# Patient Record
Sex: FEMALE | Race: BLACK OR AFRICAN AMERICAN | Employment: FULL TIME | URBAN - METROPOLITAN AREA
[De-identification: names, ages, dates, MRNs, and addresses within clinical notes are randomized per-mention and may not be internally consistent; named-entity substitution may affect disease eponyms.]

---

## 2018-06-19 NOTE — H&P
PATIENT: Jhonny Crow  YOB: 1960  DATE: 06/11/2018 11:15 AM   VISIT TYPE: Office Visit  _____________________________________________________________    Completed Orders (this encounter)  Order Deborah Villegas Interpretation Result Next Lab Date   surgery instructions given education       surgery instructions given education         Assessment/Plan  # Detail Type Description    1. Assessment Cancer of central portion of female breast (C50.119). Impression discussed placement of port, risks and benefits. Patient Plan port a cath placement                  This 59year old female presents for Breast cancer. History of Present Illness:  1. Breast cancer      The initial visit date was 05/29/2018. The date of diagnosis was May 2018. The patient reports a performance level of 100% (Karnofsky Performance Scale). The patient reports the pain level is 0/10. The problem is stable. Initial symptoms include lymphadenopathy. Self breast exam was performed. Mammogram was performed. Clinical breast exam was performed. Identified risk factors include family history of breast cancer. Performance status is scored as a 0. The patient is fully active and able to perform all normal activities. Pertinent negatives include bleeding, chest pain, cough, dyspnea, dysuria, fever, headache, night sweats and weight loss. Additional information includes MRI shows enlarged R axillary LN's, there are 2 areas in R LIQ that will require bx, she has seen med onc and will start neoadjuvant CT. PAST MEDICAL/SURGICAL HISTORY  (Detailed)      Family History:  (Detailed)    Social History:  (Detailed)  Tobacco use reviewed. Preferred language is [place]. MARITAL STATUS/FAMILY/SOCIAL SUPPORT  Currently . Smoking status: Never smoker.     SMOKING STATUS  Use Status Type Smoking Status Usage Per Day Years Used Total Pack Years   no/never  Never smoker          Sabianism/SPIRITUAL  The patient has None Jew affiliation. Allergies:  Ingredient Reaction Medication Name Comment   NO KNOWN ALLERGIES        Review of Systems  System Neg/Pos Details   Constitutional Negative Fever, night sweats and weight loss. ENMT Negative Hearing loss, tinnitus, vertigo and voice change. Eyes Negative Diplopia and vision loss. Respiratory Negative Asthma, cough, dyspnea, hemoptysis, known TB exposure and wheezing. Cardio Negative Chest pain, claudication, edema, irregular heartbeat/palpitations and thrombophlebitis. GI Negative Bloating, dysphagia, hemorrhoids, jaundice and reflux.  Negative Dysuria, nocturia, passage stone/gravel and urinary incontinence. Endocrine Negative Cold intolerance and goiter. Neuro Negative Headache and syncope. Integumentary Negative Change in shape/size of mole(s) and skin lesion. MS Negative Back pain and bone/joint symptoms. Hema/Lymph Negative Easy bleeding and easy bruising. Allergic/Immuno Negative Contact allergy and contact dermatitis. Vital Signs     Height  Time ft in cm Last Measured Height Position   10:50 AM 5.0 3.00 160.02 06/11/2018 0     Blood Pressure  Time BP mm/Hg Position Side Site Method Cuff Size   10:50 /82 sitting left arm automatic adult large     Temperature/Pulse/Respiration  Time Temp F Temp C Temp Site Pulse/min Pattern Resp/ min   10:50 AM 98.30 36.83  78 regular      Pain Scale  Time Pain Score Method   10:50 AM 0/10 Numeric Pain Intensity Scale     Comments  Time Comments   10:50 AM  Unable to obtain Weight. Measured By  Time Measured by   10:50 AM [place] [employer]       Physical Exam:  Exam Findings Details   Constitutional Normal No acute distress. Well nourished. Well developed. Eyes Normal General - Right: Normal, Left: Normal. Sclera - Right: Normal, Left: Normal.   Respiratory Normal Cough - Absent. Effort - Normal.   Cardiovascular Normal Inspection - JVD: Absent. Heart rate - Regular rate. Rhythm - Regular. Extremity Normal No Cyanosis. No Edema. Neurological Normal Level of consciousness - Normal. Orientation - Normal. Memory - Normal.   Psychiatric Normal Orientation - Oriented to time, place, person & situation. No agitation. Not anxious. Appropriate mood and affect.        Completed by:        Solange Noland 06/11/2018 2:28 PM   Document generated by:  Luz Elena Donato 06/11/2018 02:28 PM     -----------------------------------------------------------------------------------------------------------                          Electronically signed by Luz Elena Donato MD on 06/13/2018 11:13 AM

## 2018-06-21 ENCOUNTER — ANESTHESIA (OUTPATIENT)
Dept: SURGERY | Age: 58
End: 2018-06-21
Payer: COMMERCIAL

## 2018-06-21 ENCOUNTER — HOSPITAL ENCOUNTER (OUTPATIENT)
Age: 58
Setting detail: OUTPATIENT SURGERY
Discharge: HOME | End: 2018-06-21
Attending: SURGERY | Admitting: SURGERY
Payer: COMMERCIAL

## 2018-06-21 ENCOUNTER — APPOINTMENT (OUTPATIENT)
Dept: GENERAL RADIOLOGY | Age: 58
End: 2018-06-21
Attending: SURGERY
Payer: COMMERCIAL

## 2018-06-21 ENCOUNTER — ANESTHESIA EVENT (OUTPATIENT)
Dept: SURGERY | Age: 58
End: 2018-06-21
Payer: COMMERCIAL

## 2018-06-21 VITALS
TEMPERATURE: 98.6 F | WEIGHT: 179.38 LBS | HEIGHT: 63 IN | SYSTOLIC BLOOD PRESSURE: 107 MMHG | DIASTOLIC BLOOD PRESSURE: 64 MMHG | OXYGEN SATURATION: 98 % | RESPIRATION RATE: 16 BRPM | BODY MASS INDEX: 31.79 KG/M2 | HEART RATE: 76 BPM

## 2018-06-21 DIAGNOSIS — C50.311 MALIGNANT NEOPLASM OF LOWER-INNER QUADRANT OF RIGHT FEMALE BREAST, UNSPECIFIED ESTROGEN RECEPTOR STATUS (HCC): Primary | ICD-10-CM

## 2018-06-21 PROCEDURE — 74011250636 HC RX REV CODE- 250/636: Performed by: SURGERY

## 2018-06-21 PROCEDURE — 74011250636 HC RX REV CODE- 250/636

## 2018-06-21 PROCEDURE — 74011000250 HC RX REV CODE- 250: Performed by: SURGERY

## 2018-06-21 PROCEDURE — 74011000250 HC RX REV CODE- 250

## 2018-06-21 PROCEDURE — 76010000138 HC OR TIME 0.5 TO 1 HR: Performed by: SURGERY

## 2018-06-21 PROCEDURE — 77030038020 HC MANFLD NEPTUNE STRY -B: Performed by: SURGERY

## 2018-06-21 PROCEDURE — 77030011267 HC ELECTRD BLD COVD -A: Performed by: SURGERY

## 2018-06-21 PROCEDURE — 77030002933 HC SUT MCRYL J&J -A: Performed by: SURGERY

## 2018-06-21 PROCEDURE — 77030002986 HC SUT PROL J&J -A: Performed by: SURGERY

## 2018-06-21 PROCEDURE — 76210000026 HC REC RM PH II 1 TO 1.5 HR: Performed by: SURGERY

## 2018-06-21 PROCEDURE — 76060000032 HC ANESTHESIA 0.5 TO 1 HR: Performed by: SURGERY

## 2018-06-21 PROCEDURE — 77030020782 HC GWN BAIR PAWS FLX 3M -B: Performed by: SURGERY

## 2018-06-21 PROCEDURE — C1788 PORT, INDWELLING, IMP: HCPCS | Performed by: SURGERY

## 2018-06-21 PROCEDURE — 77030032490 HC SLV COMPR SCD KNE COVD -B: Performed by: SURGERY

## 2018-06-21 PROCEDURE — 77030020269 HC MISC IMPL: Performed by: SURGERY

## 2018-06-21 RX ORDER — BUPIVACAINE HYDROCHLORIDE 5 MG/ML
INJECTION, SOLUTION EPIDURAL; INTRACAUDAL AS NEEDED
Status: DISCONTINUED | OUTPATIENT
Start: 2018-06-21 | End: 2018-06-21 | Stop reason: HOSPADM

## 2018-06-21 RX ORDER — ONDANSETRON 2 MG/ML
INJECTION INTRAMUSCULAR; INTRAVENOUS AS NEEDED
Status: DISCONTINUED | OUTPATIENT
Start: 2018-06-21 | End: 2018-06-21 | Stop reason: HOSPADM

## 2018-06-21 RX ORDER — ONDANSETRON 2 MG/ML
4 INJECTION INTRAMUSCULAR; INTRAVENOUS ONCE
Status: CANCELLED | OUTPATIENT
Start: 2018-06-21 | End: 2018-06-21

## 2018-06-21 RX ORDER — LIDOCAINE HYDROCHLORIDE 20 MG/ML
INJECTION, SOLUTION EPIDURAL; INFILTRATION; INTRACAUDAL; PERINEURAL AS NEEDED
Status: DISCONTINUED | OUTPATIENT
Start: 2018-06-21 | End: 2018-06-21 | Stop reason: HOSPADM

## 2018-06-21 RX ORDER — FENTANYL CITRATE 50 UG/ML
25 INJECTION, SOLUTION INTRAMUSCULAR; INTRAVENOUS AS NEEDED
Status: CANCELLED | OUTPATIENT
Start: 2018-06-21

## 2018-06-21 RX ORDER — SODIUM CHLORIDE, SODIUM LACTATE, POTASSIUM CHLORIDE, CALCIUM CHLORIDE 600; 310; 30; 20 MG/100ML; MG/100ML; MG/100ML; MG/100ML
125 INJECTION, SOLUTION INTRAVENOUS CONTINUOUS
Status: DISCONTINUED | OUTPATIENT
Start: 2018-06-21 | End: 2018-06-21 | Stop reason: HOSPADM

## 2018-06-21 RX ORDER — HYDROCODONE BITARTRATE AND ACETAMINOPHEN 5; 300 MG/1; MG/1
1 TABLET ORAL
Qty: 20 TAB | Refills: 0 | Status: SHIPPED | OUTPATIENT
Start: 2018-06-21 | End: 2018-12-24

## 2018-06-21 RX ORDER — PROPOFOL 10 MG/ML
INJECTION, EMULSION INTRAVENOUS
Status: DISCONTINUED | OUTPATIENT
Start: 2018-06-21 | End: 2018-06-21 | Stop reason: HOSPADM

## 2018-06-21 RX ORDER — FENTANYL CITRATE 50 UG/ML
50 INJECTION, SOLUTION INTRAMUSCULAR; INTRAVENOUS
Status: CANCELLED | OUTPATIENT
Start: 2018-06-21

## 2018-06-21 RX ORDER — MAGNESIUM SULFATE 100 %
4 CRYSTALS MISCELLANEOUS AS NEEDED
Status: CANCELLED | OUTPATIENT
Start: 2018-06-21

## 2018-06-21 RX ORDER — SODIUM CHLORIDE 9 MG/ML
125 INJECTION, SOLUTION INTRAVENOUS CONTINUOUS
Status: CANCELLED | OUTPATIENT
Start: 2018-06-21

## 2018-06-21 RX ORDER — FENTANYL CITRATE 50 UG/ML
INJECTION, SOLUTION INTRAMUSCULAR; INTRAVENOUS AS NEEDED
Status: DISCONTINUED | OUTPATIENT
Start: 2018-06-21 | End: 2018-06-21 | Stop reason: HOSPADM

## 2018-06-21 RX ORDER — DEXAMETHASONE SODIUM PHOSPHATE 4 MG/ML
INJECTION, SOLUTION INTRA-ARTICULAR; INTRALESIONAL; INTRAMUSCULAR; INTRAVENOUS; SOFT TISSUE AS NEEDED
Status: DISCONTINUED | OUTPATIENT
Start: 2018-06-21 | End: 2018-06-21 | Stop reason: HOSPADM

## 2018-06-21 RX ORDER — MIDAZOLAM HYDROCHLORIDE 1 MG/ML
INJECTION, SOLUTION INTRAMUSCULAR; INTRAVENOUS AS NEEDED
Status: DISCONTINUED | OUTPATIENT
Start: 2018-06-21 | End: 2018-06-21 | Stop reason: HOSPADM

## 2018-06-21 RX ORDER — DEXTROSE 50 % IN WATER (D50W) INTRAVENOUS SYRINGE
25-50 AS NEEDED
Status: CANCELLED | OUTPATIENT
Start: 2018-06-21

## 2018-06-21 RX ORDER — NALOXONE HYDROCHLORIDE 0.4 MG/ML
0.1 INJECTION, SOLUTION INTRAMUSCULAR; INTRAVENOUS; SUBCUTANEOUS AS NEEDED
Status: CANCELLED | OUTPATIENT
Start: 2018-06-21

## 2018-06-21 RX ORDER — SODIUM CHLORIDE 0.9 % (FLUSH) 0.9 %
5-10 SYRINGE (ML) INJECTION AS NEEDED
Status: CANCELLED | OUTPATIENT
Start: 2018-06-21

## 2018-06-21 RX ADMIN — DEXAMETHASONE SODIUM PHOSPHATE 4 MG: 4 INJECTION, SOLUTION INTRA-ARTICULAR; INTRALESIONAL; INTRAMUSCULAR; INTRAVENOUS; SOFT TISSUE at 13:55

## 2018-06-21 RX ADMIN — PROPOFOL 75 MCG/KG/MIN: 10 INJECTION, EMULSION INTRAVENOUS at 13:40

## 2018-06-21 RX ADMIN — SODIUM CHLORIDE, SODIUM LACTATE, POTASSIUM CHLORIDE, AND CALCIUM CHLORIDE: 600; 310; 30; 20 INJECTION, SOLUTION INTRAVENOUS at 13:55

## 2018-06-21 RX ADMIN — LIDOCAINE HYDROCHLORIDE 100 MG: 20 INJECTION, SOLUTION EPIDURAL; INFILTRATION; INTRACAUDAL; PERINEURAL at 13:40

## 2018-06-21 RX ADMIN — MIDAZOLAM HYDROCHLORIDE 2 MG: 1 INJECTION, SOLUTION INTRAMUSCULAR; INTRAVENOUS at 13:32

## 2018-06-21 RX ADMIN — SODIUM CHLORIDE, SODIUM LACTATE, POTASSIUM CHLORIDE, AND CALCIUM CHLORIDE 125 ML/HR: 600; 310; 30; 20 INJECTION, SOLUTION INTRAVENOUS at 12:21

## 2018-06-21 RX ADMIN — ONDANSETRON 4 MG: 2 INJECTION INTRAMUSCULAR; INTRAVENOUS at 13:32

## 2018-06-21 RX ADMIN — FENTANYL CITRATE 100 MCG: 50 INJECTION, SOLUTION INTRAMUSCULAR; INTRAVENOUS at 13:32

## 2018-06-21 NOTE — ANESTHESIA POSTPROCEDURE EVALUATION
Post-Anesthesia Evaluation and Assessment    Cardiovascular Function/Vital Signs  Visit Vitals    /64    Pulse 76    Temp 37 °C (98.6 °F)    Resp 16    Ht 5' 3\" (1.6 m)    Wt 81.4 kg (179 lb 6 oz)    SpO2 98%    BMI 31.77 kg/m2       Patient is status post Procedure(s):  PORT A CATHETER PLACEMENT W/C-ARM. Nausea/Vomiting: Controlled. Postoperative hydration reviewed and adequate. Pain:  Pain Scale 1: Numeric (0 - 10) (06/21/18 1515)  Pain Intensity 1: 0 (06/21/18 1515)   Managed. Neurological Status:   Neuro (WDL): Within Defined Limits (06/21/18 1209)   At baseline. Mental Status and Level of Consciousness: Arousable. Pulmonary Status:   O2 Device: Room air (06/21/18 1445)   Adequate oxygenation and airway patent. Complications related to anesthesia: None    Post-anesthesia assessment completed. No concerns. Patient has met all discharge requirements.     Signed By: Felicita Rangel MD    June 21, 2018

## 2018-06-21 NOTE — INTERVAL H&P NOTE
H&P Update:  Charity Mac was seen and examined. History and physical has been reviewed. The patient has been examined. There have been no significant clinical changes since the completion of the originally dated History and Physical.  Patient identified by surgeon; surgical site was confirmed by patient and surgeon.     Signed By: Monisha Washington MD     June 21, 2018 12:32 PM

## 2018-06-21 NOTE — DISCHARGE INSTRUCTIONS
May shower in 2 days. Return to see Dr. Bryce Appiah in 1 week. Implanted Port: What to Expect at [address] have had a procedure to implant a port. A port is a device placed, in most cases, under the skin of your chest below your collarbone. It is made of plastic, stainless steel, or titanium. It's about the size of a quarter, but thicker. It looks like a small bump under your skin. A thin, flexible tube called a catheter runs under the skin from the port into a large vein. With the port, you will be able to get medicines (such as chemotherapy) with more comfort. You also can get blood, nutrients, or other fluids. Blood can be taken through the port for tests. You will probably have some discomfort and bruising at the port site. This will go away in a few days. The port can be used right away. You may have the port for weeks, months, or longer. Your port will need to be flushed out regularly to keep it open. A nurse or other health professional will do this for you. This care sheet gives you a general idea about how long it will take for you to recover. But each person recovers at a different pace. Follow the steps below to feel better as quickly as possible. How can you care for yourself at home? Activity  ? · Avoid arm and upper body movements that may pull on the catheter. These movements include heavy weight lifting and vigorous use of your arms. ? · You will probably need to take 1 day off from work and will be able to return to normal activities shortly after. This depends on the type of work you do, why you have the catheter, and how you feel. ? · You probably will be able to take baths and swim. But you may need to avoid some activities. Talk to your doctor about any limits on your activity. ? · Ask your doctor when you can drive again. Be careful when you pull your seat belt across your chest so it doesn't pull out the catheter. It's okay if the seat belt lays over the catheter. Medicines  ? · Your doctor will tell you if and when you can restart your medicines. He or she will also give you instructions about taking any new medicines. ? · If you take blood thinners, such as warfarin (Coumadin), clopidogrel (Plavix), or aspirin, be sure to talk to your doctor. He or she will tell you if and when to start taking those medicines again. Make sure that you understand exactly what your doctor wants you to do. ? · Be safe with medicines. Read and follow all instructions on the label. ¨ If the doctor gave you a prescription medicine for pain, take it as prescribed. ¨ If you are not taking a prescription pain medicine, ask your doctor if you can take an over-the-counter medicine. Incision care  ? · If you have a bandage, your doctor will tell you when you can remove it. After you remove the bandage, you may shower. Wash the area with soap and water and pat it dry. Don't use hydrogen peroxide or alcohol, which can slow healing. You may cover the area with a gauze bandage if it weeps or rubs against clothing. Change the bandage every day. ? · If you have strips of tape on the cut (incision) the doctor made, leave the tape on for a week or until it falls off. Other instructions  ? · Always carry the medical alert card that your doctor gives you. It contains information about your port. It will tell health care workers that you have a port in case you need emergency care. ? · When you get dressed, be careful not to rub the port. Do not wear a bra or suspenders that irritate your skin near the port. ? · Do not have your blood pressure taken on the arm with the port. Follow-up care is a key part of your treatment and safety. Be sure to make and go to all appointments, and call your doctor if you are having problems. It's also a good idea to know your test results and keep a list of the medicines you take. When should you call for help?   Call your doctor now or seek immediate medical care if:  ? · You have signs of infection, such as:  ¨ Increased pain, swelling, warmth, or redness. ¨ Red streaks leading from the area. ¨ Pus draining from the area. ¨ A fever. ? · You have pain or swelling in your neck or arm. ? · You have trouble breathing. ? Watch closely for changes in your health, and be sure to contact your doctor if:  ? · You have any problems with your line or port. Where can you learn more? Go to http://suzi-chapo.info/. Enter M256 in the search box to learn more about \"Implanted Port: What to Expect at Home. \"  Current as of: March 20, 2017  Content Version: 11.4  © 4174-6377 [employer]. Care instructions adapted under license by [employer] (which disclaims liability or warranty for this information). If you have questions about a medical condition or this instruction, always ask your healthcare professional. [place] [address] any warranty or liability for your use of this information. DISCHARGE SUMMARY from Nurse    PATIENT INSTRUCTIONS:    After general anesthesia or intravenous sedation, for 24 hours or while taking prescription Narcotics:  · Limit your activities  · Do not drive and operate hazardous machinery  · Do not make important personal or business decisions  · Do  not drink alcoholic beverages  · If you have not urinated within 8 hours after discharge, please contact your surgeon on call.     Report the following to your surgeon:  · Excessive pain, swelling, redness or odor of or around the surgical area  · Temperature over 100.5  · Nausea and vomiting lasting longer than 4 hours or if unable to take medications  · Any signs of decreased circulation or nerve impairment to extremity: change in color, persistent  numbness, tingling, coldness or increase pain  · Any questions    What to do at Home:  Recommended activity: Activity as tolerated and no driving for today,     If you experience any of the following symptoms as per above, please follow up with Dr Muñiz at [phone]. *  Please give a list of your current medications to your Primary Care Provider. *  Please update this list whenever your medications are discontinued, doses are      changed, or new medications (including over-the-counter products) are added. *  Please carry medication information at all times in case of emergency situations. These are general instructions for a healthy lifestyle:    No smoking/ No tobacco products/ Avoid exposure to second hand smoke  Surgeon General's Warning:  Quitting smoking now greatly reduces serious risk to your health. Obesity, smoking, and sedentary lifestyle greatly increases your risk for illness    A healthy diet, regular physical exercise & weight monitoring are important for maintaining a healthy lifestyle    You may be retaining fluid if you have a history of heart failure or if you experience any of the following symptoms:  Weight gain of 3 pounds or more overnight or 5 pounds in a week, increased swelling in our hands or feet or shortness of breath while lying flat in bed. Please call your doctor as soon as you notice any of these symptoms; do not wait until your next office visit. Recognize signs and symptoms of STROKE:    F-face looks uneven    A-arms unable to move or move unevenly    S-speech slurred or non-existent    T-time-call 911 as soon as signs and symptoms begin-DO NOT go       Back to bed or wait to see if you get better-TIME IS BRAIN. Warning Signs of HEART ATTACK     Call 911 if you have these symptoms:   Chest discomfort. Most heart attacks involve discomfort in the center of the chest that lasts more than a few minutes, or that goes away and comes back. It can feel like uncomfortable pressure, squeezing, fullness, or pain.  Discomfort in other areas of the upper body.  Symptoms can include pain or discomfort in one or both arms, the back, neck, jaw, or stomach.  Shortness of breath with or without chest discomfort.  Other signs may include breaking out in a cold sweat, nausea, or lightheadedness. Don't wait more than five minutes to call 911 - MINUTES MATTER! Fast action can save your life. Calling 911 is almost always the fastest way to get lifesaving treatment. Emergency Medical Services staff can begin treatment when they arrive -- up to an hour sooner than if someone gets to the hospital by car. Patient armband removed and shredded  The discharge information has been reviewed with the patient and spouse. The patient and spouse verbalized understanding. Discharge medications reviewed with the patient and spouse and appropriate educational materials and side effects teaching were provided.   ___________________________________________________________________________________________________________________________________

## 2018-06-21 NOTE — ANESTHESIA PREPROCEDURE EVALUATION
Anesthetic History   No history of anesthetic complications     Pertinent negatives: No PONV       Review of Systems / Medical History  Patient summary reviewed, nursing notes reviewed and pertinent labs reviewed    Pulmonary              Pertinent negatives: No COPD, asthma and smoker     Neuro/Psych   Within defined limits           Cardiovascular  Within defined limits              Pertinent negatives: No hypertension, past MI and CAD       GI/Hepatic/Renal  Within defined limits           Pertinent negatives: No GERD, liver disease and renal disease   Endo/Other        Cancer  Pertinent negatives: No diabetes   Other Findings   Comments: etoh neg           Physical Exam    Airway  Mallampati: III  TM Distance: 4 - 6 cm  Neck ROM: normal range of motion   Mouth opening: Normal     Cardiovascular               Dental    Dentition: Upper partial plate     Pulmonary                 Abdominal         Other Findings            Anesthetic Plan    ASA: 2  Anesthesia type: MAC          Induction: Intravenous  Anesthetic plan and risks discussed with: Patient

## 2018-06-21 NOTE — IP AVS SNAPSHOT
[address] [address] 
[phone] Patient: Darrin Nguyễn MRN: [id number] XDX:[id number]/81/[id number] About your hospitalization You were admitted on:  June 21, 2018 You last received care in the:  [facility] PHASE 2 RECOVERY You were discharged on:  June 21, 2018 Why you were hospitalized Your primary diagnosis was:  Not on File Follow-up Information Follow up With Details Comments Contact Info Genaro Torres MD   [id number] [place] [address] 
[phone] Anne Adrian MD Go in 1 week(s)  860 [facility] [address] [facility] 
[phone] Discharge Orders None A check imelda indicates which time of day the medication should be taken. My Medications START taking these medications Instructions Each Dose to Equal  
 Morning Noon Evening Bedtime HYDROcodone-acetaminophen 5-300 mg tablet Commonly known as:  Guanaco Hernadez Your last dose was: Your next dose is: Take 1 Tab by mouth every six (6) hours as needed. Max Daily Amount: 4 Tabs. 1 Tab CONTINUE taking these medications Instructions Each Dose to Equal  
 Morning Noon Evening Bedtime [address] 200 mcg Chew Generic drug:  Pediatric Multivit Comb#19-FA Your last dose was: Your next dose is: Take  by mouth daily. Where to Get Your Medications Information on where to get these meds will be given to you by the nurse or doctor. ! Ask your nurse or doctor about these medications HYDROcodone-acetaminophen 5-300 mg tablet Opioid Education  Prescription Opioids: What You Need to Know: 
 
Prescription opioids can be used to help relieve moderate-to-severe pain and are often prescribed following a surgery or injury, or for certain health conditions. These medications can be an important part of treatment but also come with serious risks. Opioids are strong pain medicines. Examples include hydrocodone, oxycodone, fentanyl, and morphine. Heroin is an example of an illegal opioid. It is important to work with your health care provider to make sure you are getting the safest, most effective care. WHAT ARE THE RISKS AND SIDE EFFECTS OF OPIOID USE? Prescription opioids carry serious risks of addiction and overdose, especially with prolonged use. An opioid overdose, often marked by slow breathing, can cause sudden death. The use of prescription opioids can have a number of side effects as well, even when taken as directed. · Tolerance-meaning you might need to take more of a medication for the same pain relief · Physical dependence-meaning you have symptoms of withdrawal when the medication is stopped. Withdrawal symptoms can include nausea, sweating, chills, diarrhea, stomach cramps, and muscle aches. Withdrawal can last up to several weeks, depending on which drug you took and how long you took it. · Increased sensitivity to pain · Constipation · Nausea, vomiting, and dry mouth · Sleepiness and dizziness · Confusion · Depression · Low levels of testosterone that can result in lower sex drive, energy, and strength · Itching and sweating RISKS ARE GREATER WITH:      
· History of drug misuse, substance use disorder, or overdose · Mental health conditions (such as depression or anxiety) · Sleep apnea · Older age (72 years or older) · Pregnancy Avoid alcohol while taking prescription opioids. Also, unless specifically advised by your health care provider, medications to avoid include: · Benzodiazepines (such as Xanax or Valium) · Muscle relaxants (such as Soma or Flexeril) · Hypnotics (such as Ambien or Lunesta) · Other prescription opioids KNOW YOUR OPTIONS Talk to your health care provider about ways to manage your pain that don't involve prescription opioids. Some of these options may actually work better and have fewer risks and side effects. Options may include: 
· Pain relievers such as acetaminophen, ibuprofen, and naproxen · Some medications that are also used for depression or seizures · Physical therapy and exercise · Counseling to help patients learn how to cope better with triggers of pain and stress. · Application of heat or cold compress · Massage therapy · Relaxation techniques Be Informed Make sure you know the name of your medication, how much and how often to take it, and its potential risks & side effects. IF YOU ARE PRESCRIBED OPIOIDS FOR PAIN: 
· Never take opioids in greater amounts or more often than prescribed. Remember the goal is not to be pain-free but to manage your pain at a tolerable level. · Follow up with your primary care provider to: · Work together to create a plan on how to manage your pain. · Talk about ways to help manage your pain that don't involve prescription opioids. · Talk about any and all concerns and side effects. · Help prevent misuse and abuse. · Never sell or share prescription opioids · Help prevent misuse and abuse. · Store prescription opioids in a secure place and out of reach of others (this may include visitors, children, friends, and family). · Safely dispose of unused/unwanted prescription opioids: Find your community drug take-back program or your pharmacy mail-back program, or flush them down the toilet, following guidance from the Food and Drug Administration (www.fda.gov/Drugs/ResourcesForYou). · Visit www.cdc.gov/drugoverdose to learn about the risks of opioid abuse and overdose. · If you believe you may be struggling with addiction, tell your health care provider and ask for guidance or call [facility] at 0-704-345-TXIT. Discharge Instructions May shower in 2 days. Return to see Dr. Yvan Etienne in 1 week. Implanted Port: What to Expect at [facility] Your Recovery You have had a procedure to implant a port. A port is a device placed, in most cases, under the skin of your chest below your collarbone. It is made of plastic, stainless steel, or titanium. It's about the size of a quarter, but thicker. It looks like a small bump under your skin. A thin, flexible tube called a catheter runs under the skin from the port into a large vein. With the port, you will be able to get medicines (such as chemotherapy) with more comfort. You also can get blood, nutrients, or other fluids. Blood can be taken through the port for tests. You will probably have some discomfort and bruising at the port site. This will go away in a few days. The port can be used right away. You may have the port for weeks, months, or longer. Your port will need to be flushed out regularly to keep it open. A nurse or other health professional will do this for you. This care sheet gives you a general idea about how long it will take for you to recover. But each person recovers at a different pace. Follow the steps below to feel better as quickly as possible. How can you care for yourself at home? Activity ? · Avoid arm and upper body movements that may pull on the catheter. These movements include heavy weight lifting and vigorous use of your arms. ? · You will probably need to take 1 day off from work and will be able to return to normal activities shortly after. This depends on the type of work you do, why you have the catheter, and how you feel. ? · You probably will be able to take baths and swim. But you may need to avoid some activities. Talk to your doctor about any limits on your activity. ? · Ask your doctor when you can drive again.  Be careful when you pull your seat belt across your chest so it doesn't pull out the catheter. It's okay if the seat belt lays over the catheter. Medicines ? · Your doctor will tell you if and when you can restart your medicines. He or she will also give you instructions about taking any new medicines. ? · If you take blood thinners, such as warfarin (Coumadin), clopidogrel (Plavix), or aspirin, be sure to talk to your doctor. He or she will tell you if and when to start taking those medicines again. Make sure that you understand exactly what your doctor wants you to do. ? · Be safe with medicines. Read and follow all instructions on the label. ¨ If the doctor gave you a prescription medicine for pain, take it as prescribed. ¨ If you are not taking a prescription pain medicine, ask your doctor if you can take an over-the-counter medicine. Incision care ? · If you have a bandage, your doctor will tell you when you can remove it. After you remove the bandage, you may shower. Wash the area with soap and water and pat it dry. Don't use hydrogen peroxide or alcohol, which can slow healing. You may cover the area with a gauze bandage if it weeps or rubs against clothing. Change the bandage every day. ? · If you have strips of tape on the cut (incision) the doctor made, leave the tape on for a week or until it falls off. Other instructions ? · Always carry the medical alert card that your doctor gives you. It contains information about your port. It will tell health care workers that you have a port in case you need emergency care. ? · When you get dressed, be careful not to rub the port. Do not wear a bra or suspenders that irritate your skin near the port. ? · Do not have your blood pressure taken on the arm with the port. Follow-up care is a key part of your treatment and safety.  Be sure to make and go to all appointments, and call your doctor if you are having problems. It's also a good idea to know your test results and keep a list of the medicines you take. When should you call for help? Call your doctor now or seek immediate medical care if: 
? · You have signs of infection, such as: 
¨ Increased pain, swelling, warmth, or redness. ¨ Red streaks leading from the area. ¨ Pus draining from the area. ¨ A fever. ? · You have pain or swelling in your neck or arm. ? · You have trouble breathing. ? Watch closely for changes in your health, and be sure to contact your doctor if: 
? · You have any problems with your line or port. Where can you learn more? Go to http://suzi-chapo.info/. Enter M256 in the search box to learn more about \"Implanted Port: What to Expect at Home. \" Current as of: March 20, 2017 Content Version: 11.4 © 1802-0904 [employer]. Care instructions adapted under license by [employer] (which disclaims liability or warranty for this information). If you have questions about a medical condition or this instruction, always ask your healthcare professional. [place] [address] any warranty or liability for your use of this information. DISCHARGE SUMMARY from Nurse PATIENT INSTRUCTIONS: 
 
 
F-face looks uneven A-arms unable to move or move unevenly S-speech slurred or non-existent T-time-call 911 as soon as signs and symptoms begin-DO NOT go Back to bed or wait to see if you get better-TIME IS BRAIN. Warning Signs of HEART ATTACK Call 911 if you have these symptoms: 
? Chest discomfort.  Most heart attacks involve discomfort in the center of the chest that lasts more than a few minutes, or that goes away and comes back. It can feel like uncomfortable pressure, squeezing, fullness, or pain. ? Discomfort in other areas of the upper body. Symptoms can include pain or discomfort in one or both arms, the back, neck, jaw, or stomach. ? Shortness of breath with or without chest discomfort. ? Other signs may include breaking out in a cold sweat, nausea, or lightheadedness. Don't wait more than five minutes to call [address]! Fast action can save your life. Calling 911 is almost always the fastest way to get lifesaving treatment. Emergency Medical Services staff can begin treatment when they arrive  up to an hour sooner than if someone gets to the hospital by car. Patient armband removed and shredded The discharge information has been reviewed with the patient and spouse. The patient and spouse verbalized understanding. Discharge medications reviewed with the patient and spouse and appropriate educational materials and side effects teaching were provided. ___________________________________________________________________________________________________________________________________ Introducing [facility]! [employer] introduces [employer] patient portal. Now you can access parts of your medical record, email your doctor's office, and request medication refills online. 1. In your internet browser, go to [email or web address] 2. Click on the First Time User? Click Here link in the Sign In box. You will see the New Member Sign Up page. 3. Enter your [employer] Access Code exactly as it appears below. You will not need to use this code after youve completed the sign-up process. If you do not sign up before the expiration date, you must request a new code. · [employer] Access Code: [email or web address] Expires: 9/9/2018  5:18 PM 
 
4. Enter the last four digits of your Social Security Number (xxxx) and Date of Birth (mm/dd/yyyy) as indicated and click Submit.  You will be taken to the next sign-up page. 5. Create a Media Armort ID. This will be your [employer] login ID and cannot be changed, so think of one that is secure and easy to remember. 6. Create a [employer] password. You can change your password at any time. 7. Enter your Password Reset Question and Answer. This can be used at a later time if you forget your password. 8. Enter your e-mail address. You will receive e-mail notification when new information is available in [address] 9. Click Sign Up. You can now view and download portions of your medical record. 10. Click the Download Summary menu link to download a portable copy of your medical information. If you have questions, please visit the Frequently Asked Questions section of the [employer] website. Remember, [employer] is NOT to be used for urgent needs. For medical emergencies, dial 911. Now available from your iPhone and Android! Introducing Sam Rea As a [facility] patient, I wanted to make you aware of our electronic visit tool called Sam Rea. [facility] 24/7 allows you to connect within minutes with a medical provider 24 hours a day, seven days a week via a mobile device or tablet or logging into a secure website from your computer. You can access Sam Rea from anywhere in the [place]. A virtual visit might be right for you when you have a simple condition and feel like you just dont want to get out of bed, or cant get away from work for an appointment, when your regular [facility] provider is not available (evenings, weekends or holidays), or when youre out of town and need minor care. Electronic visits cost only $49 and if the [facility] 24/7 provider determines a prescription is needed to treat your condition, one can be electronically transmitted to a nearby pharmacy*. Please take a moment to enroll today if you have not already done so.   The enrollment process is free and takes just a few minutes. To enroll, please download the [employer] 24/7 tanvir to your tablet or phone, or visit [email or web address] to enroll on your computer. And, as an [address] [place] [address] patient with a Wishdates account, the results of your visits will be scanned into your electronic medical record and your primary care provider will be able to view the scanned results. We urge you to continue to see your regular [employer] provider for your ongoing medical care. And while your primary care provider may not be the one available when you seek a Positron virtual visit, the peace of mind you get from getting a real diagnosis real time can be priceless. For more information on Posit[employer], view our Frequently Asked Questions (FAQs) at [email or web address]. Sincerely, 
 
Luis Sarabia MD 
Chief Medical Officer Pawan Claudia Massimo *:  certain medications cannot be prescribed via Posit[employer] Unresulted tests-please follow up with your PCP on these results Procedure/Test Authorizing Provider [place] XR TECHNOLOGIST Felipe Hernandez MD  
  
Providers Seen During Your Hospitalization Provider Specialty Primary office phone Randene Hatchet, MD General Surgery [phone] Your Primary Care Physician (PCP) Primary Care Physician Office Phone Office Fax [address] [facility] [phone] You are allergic to the following No active allergies Recent Documentation Height Weight BMI OB Status Smoking Status 1.6 m 81.4 kg 31.77 kg/m2 Postmenopausal Never Smoker Emergency Contacts Name Discharge Info Relation Home Work Mobile [employer] [facility] CAREGIVER [3] Spouse [3] [phone] Patient Belongings The following personal items are in your possession at time of discharge: Dental Appliances: Partials, Sent home, Uppers (Given to [facility] )  Visual Aid: Glasses, Sent home (read[facility] )      Home Medications: None   Jewelry: Bracelet, Earrings, Necklace, Ring, Sent home (GIven to [facility], 4 rings, one necklace and one pair of earrings , and bracelet )  Clothing: Neena park, Sent home, Shirt, Pants, Undergarments (GIven to [facility] )    Other Valuables: Eyeglasses, Purse (Given to [facility] ) Please provide this summary of care documentation to your next provider. Signatures-by signing, you are acknowledging that this After Visit Summary has been reviewed with you and you have received a copy. Patient Signature:  ____________________________________________________________ Date:  ____________________________________________________________  
  
Sarah Dimas Provider Signature:  ____________________________________________________________ Date:  ____________________________________________________________

## 2018-06-22 NOTE — OP NOTES
[facility]  OPERATIVE REPORT    Lexie Neri  MR#: [id number]  : 1960  ACCOUNT #: [de-identified]   DATE OF SERVICE: 2018    PREOPERATIVE DIAGNOSIS:  Stage II right breast cancer. POSTOPERATIVE DIAGNOSIS:  Stage II right breast cancer. PROCEDURE:  Left subclavian Port-A-Cath placement. ATTENDING SURGEON:  Valentín Stevens MD     FIRST ASSISTANT:  Erum Patton     ANESTHESIA:  MAC.    INDICATION FOR PROCEDURE:  A 25-year-old female with right breast cancer, which requires systemic chemotherapy. She is brought to the operating room for placement of a Port-A-Cath. DESCRIPTION OF THE PROCEDURE:  The patient was brought to the operating room, placed on the table in the supine position. After placing monitors and adequate intravenous sedation, both arms were tucked and a roll was placed on the upper back. Her upper chest and neck were prepped and draped in the usual sterile fashion. Marcaine was injected locally in the skin and subcutaneous tissue in the left infraclavicular area. An 18 gauge needle was used to percutaneously access the left subclavian vein. A flexible J-wire was placed in the superior vena cava documented by fluoroscopy. Marcaine was injected inferior to the wire site and a small horizontal incision was made through the skin down to subcutaneous tissue. Then, using electrocautery, a pocket was created. A small incision was made at the wire site and the catheter was tunneled from the pocket to the wire. A sheath and dilator were placed over the wire into the superior vena cava documented by fluoroscopy. The wire and dilator were removed and the catheter was placed via the sheath to the superior vena cava and the sheath was removed. Dark blood was aspirated from the catheter and it was flushed with injectable saline. The catheter was in the superior vena cava and there was no pneumothorax documented by fluoroscopy.   The catheter was cut and secured to the port which was placed in the pocket. The port was secured with 2 interrupted 2-0 Prolene sutures. Dark blood was aspirated from the port and it was flushed with injectable saline. Subcutaneous tissue was closed with interrupted 3-0 Monocryl suture and 4-0 Monocryl was used to reapproximate the skin. Steri-Strips were applied and the wound was dressed sterilely. The sponge, instrument, needle count was correct at the end of the procedure. ESTIMATED BLOOD LOSS:  3 mL. SPECIMENS REMOVED:  None. IMPLANTS:  Port-A-Cath. DRAINS:  None. COMPLICATIONS:  None. MD Javan Joseph / Elza Chris  D: 06/21/2018 20:30     T: 06/21/2018 23:12  JOB #: [id number]

## 2018-12-20 ENCOUNTER — HOSPITAL ENCOUNTER (OUTPATIENT)
Dept: PREADMISSION TESTING | Age: 58
Discharge: HOME | End: 2018-12-20
Payer: COMMERCIAL

## 2018-12-20 ENCOUNTER — HOSPITAL ENCOUNTER (OUTPATIENT)
Dept: LAB | Age: 58
Discharge: HOME | End: 2018-12-20

## 2018-12-20 LAB
ATRIAL RATE: 66 BPM
CALCULATED P AXIS, ECG09: 71 DEGREES
CALCULATED R AXIS, ECG10: 69 DEGREES
CALCULATED T AXIS, ECG11: 71 DEGREES
DIAGNOSIS, 93000: NORMAL
P-R INTERVAL, ECG05: 148 MS
Q-T INTERVAL, ECG07: 402 MS
QRS DURATION, ECG06: 80 MS
QTC CALCULATION (BEZET), ECG08: 421 MS
VENTRICULAR RATE, ECG03: 66 BPM
XX-LABCORP SPECIMEN COL,LCBCF: NORMAL

## 2018-12-20 PROCEDURE — 99001 SPECIMEN HANDLING PT-LAB: CPT

## 2018-12-20 PROCEDURE — 93005 ELECTROCARDIOGRAM TRACING: CPT

## 2018-12-26 ENCOUNTER — ANESTHESIA EVENT (OUTPATIENT)
Dept: SURGERY | Age: 58
End: 2018-12-26
Payer: COMMERCIAL

## 2018-12-26 NOTE — H&P
PATIENT: Juan Ayala  YOB: 1960  DATE: 12/18/2018 2:30 PM   VISIT TYPE: Office Visit  _____________________________________________________________    Completed Orders (this encounter)  Order Interpretation Result Next Lab Date   surgery instructions given education        Assessment/Plan  # Detail Type Description    1. Assessment Cancer of central portion of female breast (C50.119). Impression looks like a CR by MRI, discussed surgical options, risks and benefits. Patient Plan R brst lumpectomy, SLN bx, possible AND, will get brst cancer and LN localized          This 59year old female presents for Breast cancer. History of Present Illness:  1. Breast cancer   The initial visit date was 05/29/2018. The date of diagnosis was May 2018. The patient reports a performance level of 100% (Karnofsky Performance Scale). The location includes R brst 3 ocl. The patient reports the pain level is 0/10. The problem is clinically free of disease. Initial symptoms include palpable mass/lump. Mammogram was performed, screening date was 05/18/2018. Performance status is scored as a 0. The patient is fully active and able to perform all normal activities. There are no associated symptoms. Additional information includes pt is here to discuss surgery for mastectomy. she has already been through Chemo, MRI showed CR. PROBLEM LIST:   Problem List reviewed. PAST MEDICAL/SURGICAL HISTORY  (Detailed)      Family History  (Detailed)    Social History:  (Detailed)  Tobacco use reviewed. Preferred language is [place]. MARITAL STATUS/FAMILY/SOCIAL SUPPORT  Currently . Smoking status: Never smoker. SMOKING STATUS  Use Status Type Smoking Status Usage Per Day Years Used Total Pack Years   no/never  Never smoker        VAPING USE  Screened for vaping? No          Medication Reconciliation  Medications reconciled today. Patient is on no medications.     Allergies:  Ingredient Reaction (Severity) Medication Name Comment   NO KNOWN ALLERGIES          Review of Systems  System Neg/Pos Details   Constitutional Negative Chills/rigors and Fever. ENMT Negative Tinnitus and Vertigo. Respiratory Negative Asthma, Cough and Dyspnea. Cardio Negative Chest pain and Irregular heartbeat/palpitations. GI Negative Abdominal mass and Abdominal pain.  Negative Dysuria and Hematuria. Neuro Negative Headache, Seizures and Syncope. Integumentary Negative Rash and Skin lesion. MS Negative Back pain and Bone/joint symptoms. Hema/Lymph Negative Easy bleeding, Easy bruising and Lymphadenopathy. Allergic/Immuno Negative Contact allergy and Contact dermatitis. Reproductive Negative Breast discharge, Breast lumps and Breast pain. Vital Signs     Height  Time ft in cm Last Measured Height Position   2:57 PM 5.0 3.00 160.02 12/18/2018 0     Weight/BSA/BMI  Time lb oz kg Context BMI kg/m2 BSA m2   2:57 PM    dressed with shoes       Blood Pressure  Time BP mm/Hg Position Side Site Method Cuff Size   2:57 /72 sitting left arm manual adult     Temperature/Pulse/Respiration  Time Temp F Temp C Temp Site Pulse/min Pattern Resp/ min   2:57 PM 98.50 36.94 oral 86 regular      Pain Scale  Time Pain Score Method   2:57 PM 0/10 Numeric Pain Intensity Scale     Measured By  Time Measured by   2:57 PM Annamaria Beltre       Physical Exam:  Exam Findings Details   Constitutional * Nourishment - overweight. Constitutional Normal No acute distress. Well developed. Eyes Normal General - Right: Normal, Left: Normal. Sclera - Right: Normal, Left: Normal.   Neck Exam Normal Inspection - Normal. Palpation - Normal. Submandibular lymph nodes - Normal. Cervical lymph nodes - Normal.   Lymph Detail Normal Occipital. Postauricular. Preauricular. Submental. Submandibular. Parotid. Anterior cervical. Posterior cervical. Supraclavicular. Axillary.    Breast Normal Inspection - Bilateral: Normal. Palpation - Bilateral: Normal. Nipples - Bilateral: Normal. Lymph nodes - Normal.   Respiratory Normal Cough - Absent. Effort - Normal.   Cardiovascular Normal Inspection - JVD: Absent. Heart rate - Regular rate. Rhythm - Regular. Skin * Inspection - General inspection: warm and dry. Musculoskeletal Normal Visual overview of all four extremities is normal. Gait - Normal.   Extremity Normal No Cyanosis. No Edema. Neurological Normal Level of consciousness - Normal. Orientation - Normal. Memory - Normal.   Psychiatric Normal Orientation - Oriented to time, place, person & situation. No agitation. Not anxious. Appropriate mood and affect. Patient Education  # Patient Education   1.  Lumpectomy: What to Expect at [address] Patient Care Team Members    Name Contact Agency Type Support Role Relationship Active Date Inactive Date Specialty   Fawad Urbano   Patient provider PCP   Family Practice       Provider:   Juice Garcia 12/19/2018 10:36 AM   Document generated by:  Johnathan Morris 12/19/2018 10:36 AM                           Electronically signed by Johnathan Morris MD on 12/19/2018 12:17 PM

## 2018-12-27 ENCOUNTER — ANESTHESIA (OUTPATIENT)
Dept: SURGERY | Age: 58
End: 2018-12-27
Payer: COMMERCIAL

## 2018-12-27 ENCOUNTER — APPOINTMENT (OUTPATIENT)
Dept: NUCLEAR MEDICINE | Age: 58
End: 2018-12-27
Attending: SURGERY
Payer: COMMERCIAL

## 2018-12-27 ENCOUNTER — HOSPITAL ENCOUNTER (OUTPATIENT)
Age: 58
Setting detail: OUTPATIENT SURGERY
Discharge: HOME | End: 2018-12-27
Attending: SURGERY | Admitting: SURGERY
Payer: COMMERCIAL

## 2018-12-27 VITALS
WEIGHT: 167.44 LBS | TEMPERATURE: 98 F | SYSTOLIC BLOOD PRESSURE: 134 MMHG | BODY MASS INDEX: 29.67 KG/M2 | HEIGHT: 63 IN | OXYGEN SATURATION: 98 % | DIASTOLIC BLOOD PRESSURE: 80 MMHG | HEART RATE: 69 BPM | RESPIRATION RATE: 15 BRPM

## 2018-12-27 DIAGNOSIS — C50.119 MALIGNANT NEOPLASM OF CENTRAL PORTION OF FEMALE BREAST (HCC): ICD-10-CM

## 2018-12-27 PROCEDURE — 77030018836 HC SOL IRR NACL ICUM -A: Performed by: SURGERY

## 2018-12-27 PROCEDURE — 77030032490 HC SLV COMPR SCD KNE COVD -B: Performed by: SURGERY

## 2018-12-27 PROCEDURE — 77030008556 HC TBNG SMK EVAC COVD -A: Performed by: SURGERY

## 2018-12-27 PROCEDURE — 88307 TISSUE EXAM BY PATHOLOGIST: CPT

## 2018-12-27 PROCEDURE — 74011250636 HC RX REV CODE- 250/636

## 2018-12-27 PROCEDURE — 74011636320 HC RX REV CODE- 636/320: Performed by: SURGERY

## 2018-12-27 PROCEDURE — 77030012407 HC DRN WND BARD -B: Performed by: SURGERY

## 2018-12-27 PROCEDURE — 77030002916 HC SUT ETHLN J&J -A: Performed by: SURGERY

## 2018-12-27 PROCEDURE — 77030018719 HC DRSG PTCH ANTIMIC J&J -A: Performed by: SURGERY

## 2018-12-27 PROCEDURE — 76010000149 HC OR TIME 1 TO 1.5 HR: Performed by: SURGERY

## 2018-12-27 PROCEDURE — 74011250636 HC RX REV CODE- 250/636: Performed by: SURGERY

## 2018-12-27 PROCEDURE — A9541 TC99M SULFUR COLLOID: HCPCS

## 2018-12-27 PROCEDURE — 76060000033 HC ANESTHESIA 1 TO 1.5 HR: Performed by: SURGERY

## 2018-12-27 PROCEDURE — 88305 TISSUE EXAM BY PATHOLOGIST: CPT

## 2018-12-27 PROCEDURE — 77030010512 HC APPL CLP LIG J&J -C: Performed by: SURGERY

## 2018-12-27 PROCEDURE — 76210000063 HC OR PH I REC FIRST 0.5 HR: Performed by: SURGERY

## 2018-12-27 PROCEDURE — 88304 TISSUE EXAM BY PATHOLOGIST: CPT

## 2018-12-27 PROCEDURE — 77030012508 HC MSK AIRWY LMA AMBU -A: Performed by: ANESTHESIOLOGY

## 2018-12-27 PROCEDURE — 77030002996 HC SUT SLK J&J -A: Performed by: SURGERY

## 2018-12-27 PROCEDURE — 77030011267 HC ELECTRD BLD COVD -A: Performed by: SURGERY

## 2018-12-27 PROCEDURE — 77030013567 HC DRN WND RESERV BARD -A: Performed by: SURGERY

## 2018-12-27 PROCEDURE — 74011000250 HC RX REV CODE- 250: Performed by: SURGERY

## 2018-12-27 PROCEDURE — 76210000021 HC REC RM PH II 0.5 TO 1 HR: Performed by: SURGERY

## 2018-12-27 PROCEDURE — 77030002933 HC SUT MCRYL J&J -A: Performed by: SURGERY

## 2018-12-27 PROCEDURE — 77030020782 HC GWN BAIR PAWS FLX 3M -B: Performed by: SURGERY

## 2018-12-27 RX ORDER — SODIUM CHLORIDE, SODIUM LACTATE, POTASSIUM CHLORIDE, CALCIUM CHLORIDE 600; 310; 30; 20 MG/100ML; MG/100ML; MG/100ML; MG/100ML
125 INJECTION, SOLUTION INTRAVENOUS CONTINUOUS
Status: DISCONTINUED | OUTPATIENT
Start: 2018-12-27 | End: 2018-12-27 | Stop reason: HOSPADM

## 2018-12-27 RX ORDER — HYDROMORPHONE HYDROCHLORIDE 2 MG/ML
0.5 INJECTION, SOLUTION INTRAMUSCULAR; INTRAVENOUS; SUBCUTANEOUS
Status: DISCONTINUED | OUTPATIENT
Start: 2018-12-27 | End: 2018-12-27 | Stop reason: HOSPADM

## 2018-12-27 RX ORDER — ISOSULFAN BLUE 50 MG/5ML
INJECTION, SOLUTION SUBCUTANEOUS AS NEEDED
Status: DISCONTINUED | OUTPATIENT
Start: 2018-12-27 | End: 2018-12-27 | Stop reason: HOSPADM

## 2018-12-27 RX ORDER — DEXAMETHASONE SODIUM PHOSPHATE 4 MG/ML
INJECTION, SOLUTION INTRA-ARTICULAR; INTRALESIONAL; INTRAMUSCULAR; INTRAVENOUS; SOFT TISSUE AS NEEDED
Status: DISCONTINUED | OUTPATIENT
Start: 2018-12-27 | End: 2018-12-27 | Stop reason: HOSPADM

## 2018-12-27 RX ORDER — ONDANSETRON 2 MG/ML
INJECTION INTRAMUSCULAR; INTRAVENOUS AS NEEDED
Status: DISCONTINUED | OUTPATIENT
Start: 2018-12-27 | End: 2018-12-27 | Stop reason: HOSPADM

## 2018-12-27 RX ORDER — SODIUM CHLORIDE 0.9 % (FLUSH) 0.9 %
5-10 SYRINGE (ML) INJECTION EVERY 8 HOURS
Status: DISCONTINUED | OUTPATIENT
Start: 2018-12-27 | End: 2018-12-27 | Stop reason: HOSPADM

## 2018-12-27 RX ORDER — FENTANYL CITRATE 50 UG/ML
INJECTION, SOLUTION INTRAMUSCULAR; INTRAVENOUS AS NEEDED
Status: DISCONTINUED | OUTPATIENT
Start: 2018-12-27 | End: 2018-12-27 | Stop reason: HOSPADM

## 2018-12-27 RX ORDER — MIDAZOLAM HYDROCHLORIDE 1 MG/ML
INJECTION, SOLUTION INTRAMUSCULAR; INTRAVENOUS AS NEEDED
Status: DISCONTINUED | OUTPATIENT
Start: 2018-12-27 | End: 2018-12-27 | Stop reason: HOSPADM

## 2018-12-27 RX ORDER — PROPOFOL 10 MG/ML
INJECTION, EMULSION INTRAVENOUS AS NEEDED
Status: DISCONTINUED | OUTPATIENT
Start: 2018-12-27 | End: 2018-12-27 | Stop reason: HOSPADM

## 2018-12-27 RX ORDER — SODIUM CHLORIDE 0.9 % (FLUSH) 0.9 %
5-10 SYRINGE (ML) INJECTION AS NEEDED
Status: DISCONTINUED | OUTPATIENT
Start: 2018-12-27 | End: 2018-12-27 | Stop reason: HOSPADM

## 2018-12-27 RX ORDER — LIDOCAINE HYDROCHLORIDE 20 MG/ML
INJECTION, SOLUTION EPIDURAL; INFILTRATION; INTRACAUDAL; PERINEURAL AS NEEDED
Status: DISCONTINUED | OUTPATIENT
Start: 2018-12-27 | End: 2018-12-27 | Stop reason: HOSPADM

## 2018-12-27 RX ADMIN — MIDAZOLAM HYDROCHLORIDE 2 MG: 1 INJECTION, SOLUTION INTRAMUSCULAR; INTRAVENOUS at 13:47

## 2018-12-27 RX ADMIN — PROPOFOL 200 MG: 10 INJECTION, EMULSION INTRAVENOUS at 13:50

## 2018-12-27 RX ADMIN — DEXAMETHASONE SODIUM PHOSPHATE 8 MG: 4 INJECTION, SOLUTION INTRA-ARTICULAR; INTRALESIONAL; INTRAMUSCULAR; INTRAVENOUS; SOFT TISSUE at 13:57

## 2018-12-27 RX ADMIN — ONDANSETRON 4 MG: 2 INJECTION INTRAMUSCULAR; INTRAVENOUS at 13:57

## 2018-12-27 RX ADMIN — SODIUM CHLORIDE, SODIUM LACTATE, POTASSIUM CHLORIDE, AND CALCIUM CHLORIDE: 600; 310; 30; 20 INJECTION, SOLUTION INTRAVENOUS at 14:34

## 2018-12-27 RX ADMIN — FENTANYL CITRATE 25 MCG: 50 INJECTION, SOLUTION INTRAMUSCULAR; INTRAVENOUS at 14:04

## 2018-12-27 RX ADMIN — SODIUM CHLORIDE, SODIUM LACTATE, POTASSIUM CHLORIDE, AND CALCIUM CHLORIDE 125 ML/HR: 600; 310; 30; 20 INJECTION, SOLUTION INTRAVENOUS at 11:48

## 2018-12-27 RX ADMIN — LIDOCAINE HYDROCHLORIDE 100 MG: 20 INJECTION, SOLUTION EPIDURAL; INFILTRATION; INTRACAUDAL; PERINEURAL at 13:50

## 2018-12-27 RX ADMIN — FENTANYL CITRATE 25 MCG: 50 INJECTION, SOLUTION INTRAMUSCULAR; INTRAVENOUS at 14:01

## 2018-12-27 NOTE — PERIOP NOTES
Reviewed discharge plan of care with patient and her . ,Selena including emptying of drain and recording. They verbalized understanding. X rays from [facility] 5 sent home with patient. No signer available on WOW.

## 2018-12-27 NOTE — DISCHARGE INSTRUCTIONS
Keep dressing dry, until follow up, empty drain as we reviewed. NO pushing or pulling with right arm, no lifting anything over 10 pounds with right arm. Call Dr Barak Sherman' office for follow up appointment  DISCHARGE SUMMARY from Nurse    PATIENT INSTRUCTIONS:    After general anesthesia or intravenous sedation, for 24 hours or while taking prescription Narcotics:  · Limit your activities  · Do not drive and operate hazardous machinery  · Do not make important personal or business decisions  · Do  not drink alcoholic beverages  · If you have not urinated within 8 hours after discharge, please contact your surgeon on call. Report the following to your surgeon:  · Excessive pain, swelling, redness or odor of or around the surgical area  · Temperature over 100.5  · Nausea and vomiting lasting longer than 4 hours or if unable to take medications  · Any signs of decreased circulation or nerve impairment to extremity: change in color, persistent  numbness, tingling, coldness or increase pain  · Any questions    What to do at Home:  Recommended activity: Activity as tolerated and no driving for today,     *  Please give a list of your current medications to your Primary Care Provider. *  Please update this list whenever your medications are discontinued, doses are      changed, or new medications (including over-the-counter products) are added. *  Please carry medication information at all times in case of emergency situations. These are general instructions for a healthy lifestyle:    No smoking/ No tobacco products/ Avoid exposure to second hand smoke  Surgeon General's Warning:  Quitting smoking now greatly reduces serious risk to your health.     Obesity, smoking, and sedentary lifestyle greatly increases your risk for illness    A healthy diet, regular physical exercise & weight monitoring are important for maintaining a healthy lifestyle    You may be retaining fluid if you have a history of heart failure or if you experience any of the following symptoms:  Weight gain of 3 pounds or more overnight or 5 pounds in a week, increased swelling in our hands or feet or shortness of breath while lying flat in bed. Please call your doctor as soon as you notice any of these symptoms; do not wait until your next office visit. Recognize signs and symptoms of STROKE:    F-face looks uneven    A-arms unable to move or move unevenly    S-speech slurred or non-existent    T-time-call 911 as soon as signs and symptoms begin-DO NOT go       Back to bed or wait to see if you get better-TIME IS BRAIN. Warning Signs of HEART ATTACK     Call 911 if you have these symptoms:   Chest discomfort. Most heart attacks involve discomfort in the center of the chest that lasts more than a few minutes, or that goes away and comes back. It can feel like uncomfortable pressure, squeezing, fullness, or pain.  Discomfort in other areas of the upper body. Symptoms can include pain or discomfort in one or both arms, the back, neck, jaw, or stomach.  Shortness of breath with or without chest discomfort.  Other signs may include breaking out in a cold sweat, nausea, or lightheadedness. Don't wait more than five minutes to call 911 - MINUTES MATTER! Fast action can save your life. Calling 911 is almost always the fastest way to get lifesaving treatment. Emergency Medical Services staff can begin treatment when they arrive -- up to an hour sooner than if someone gets to the hospital by car. The discharge information has been reviewed with the patient and spouse. The patient and spouse verbalized understanding. Discharge medications reviewed with the patient and spouse and appropriate educational materials and side effects teaching were provided.   ___________________________________________________________________________________________________________________________________

## 2018-12-27 NOTE — OP NOTES
[facility] [place]  OPERATIVE REPORT    Shikha Wilson  MR#: [id number]  : 1960  ACCOUNT #: [de-identified]   DATE OF SERVICE: 2018    PREOPERATIVE DIAGNOSIS:  Right breast cancer. POSTOPERATIVE DIAGNOSIS:  Right breast cancer, right breast mass and skin tag x2. PROCEDURES PERFORMED:  Right breast lumpectomy and excision of right axillary lymph nodes, excision of right breast mass, excision of right breast skin tag x2. SURGEON:  Mahi Orellana MD    ANESTHESIA:  General and local.    INDICATION FOR PROCEDURE:  This is a 77-year-old female who had neoadjuvant chemotherapy for stage II right breast cancer. She had 2 lymph nodes that were biopsy positive for cancer. By imaging post-chemotherapy it looked as if she had a complete response. She is brought to the operating room for right breast lumpectomy and excision of axillary lymph nodes. Of note, there was a benign mass on repeat imaging near the lumpectomy site. This may be removed at the same time as lumpectomy. DESCRIPTION OF THE PROCEDURE:  After wire localization of the mass and the lymph node and nuclear medicine injection, the patient was brought to the operating room and placed on the table in the supine position. After placing monitors and adequate general anesthesia, her right breast and axilla were prepped and draped in the usual sterile fashion. SCD hose were placed preoperatively. 3 mL of Lymphazurin was injected in the dermis in the right upper outer nipple areolar complex. The mass was in the right medial breast about the 3 o'clock position. By imaging, the clip that was localized was very superficial.  Therefore, an ellipse of skin was taken adjacent to the wire through the skin down to subcutaneous tissue. Then, using electrocautery, dissection, the breast tissue was divided. It was marked with a nylon suture at the lateral margin and the skin was the anterior margin.   It was sent down to Pathology and there was no apparent mass present. Adjacent to the breast cancer within the lumpectomy cavity, there was a large smooth mass, which had been biopsied in the past to be a fibroadenoma. This was excised as well separately and sent to Pathology in formalin. There was good hemostasis with the electrocautery. Marcaine was injected locally. Clips were placed to imelda the lumpectomy cavity. Subcutaneous tissue was reapproximated with interrupted 3-0 Monocryl suture and 4-0 Monocryl was used to reapproximate the skin. Incision was made in the right axilla through the skin down to subcutaneous tissue. There were 2 skin lesions that were in the way and these were sharply excised and sent to Pathology in formalin. The lymph node that had been biopsied was inferior to the incision. A flap was then created and the wire was identified and brought through the incision. Dissection was carried down to where the lymph node was present. This was excised and sent to Pathology with other lymph nodes. Several blue lymph nodes were dissected out, which were radioactive as well. Blue lymphatic channels were clipped. These were sent to Pathology in formalin as well. An additional lymph node laterally was blue and radioactive and this was excised as well. There was no other significant lymphadenopathy. There was good hemostasis. Marcaine was injected locally. A 15-Egyptian fluted drain was placed through a separate stab incision and secured to the skin with a nylon suture. The subcutaneous tissue was reapproximated with interrupted 3-0 Monocryl suture and 4-0 Monocryl was used to reapproximate the skin. Steri-Strips were applied and the wounds were dressed sterilely. The sponge, instrument, needle count was correct at the end of the procedure. ESTIMATED BLOOD LOSS:  5 mL. SPECIMENS REMOVED:  Right breast cancer, right breast mass, right axillary lymph nodes, Right breast skin tags.     DRAIN:  15-Egyptian fluted drain.    IMPLANTS:  No other implants. COMPLICATIONS:  None. ASSISTANT:  [facility] [place]  D: 12/27/2018 15:18     T: 12/27/2018 15:50  JOB #: [id number]

## 2018-12-27 NOTE — ANESTHESIA POSTPROCEDURE EVALUATION
Post-Anesthesia Evaluation and Assessment    Cardiovascular Function/Vital Signs  Visit Vitals  /80   Pulse 66   Temp 36.2 °C (97.2 °F)   Resp 19   Ht 5' 3\" (1.6 m)   Wt 75.9 kg (167 lb 7 oz)   SpO2 100%   BMI 29.66 kg/m²       Patient is status post Procedure(s):  RIGHT BREAST LUMPECTOMY WITH FROZEN SECTION, SENTINAL LYMPH NODE BIOPSY, AXILLARY LYMPH NODE DISSECTION ([facility] @ 8:30, WIRE LOC CLIP + AXILLARY CLIP/NUC MED @ 1130  INJECTION, \"SPEC POP\". Nausea/Vomiting: Controlled. Postoperative hydration reviewed and adequate. Pain:  Pain Scale 1: Numeric (0 - 10) (12/27/18 1509)  Pain Intensity 1: 2 (12/27/18 1509)   Managed. Neurological Status:   Neuro (WDL): Within Defined Limits (12/27/18 1139)   At baseline. Mental Status and Level of Consciousness: Baseline and appropriate for discharge. Pulmonary Status:   O2 Device: Room air (12/27/18 1509)   Adequate oxygenation and airway patent. Complications related to anesthesia: None    Post-anesthesia assessment completed. No concerns. Patient has met all discharge requirements.     Signed By: Dayne Gomez CRNA    December 27, 2018

## 2018-12-27 NOTE — ANESTHESIA PREPROCEDURE EVALUATION
Anesthetic History   No history of anesthetic complications            Review of Systems / Medical History  Patient summary reviewed, nursing notes reviewed and pertinent labs reviewed    Pulmonary  Within defined limits                 Neuro/Psych   Within defined limits           Cardiovascular  Within defined limits                Exercise tolerance: >4 METS     GI/Hepatic/Renal  Within defined limits              Endo/Other  Within defined limits           Other Findings              Physical Exam    Airway  Mallampati: II  TM Distance: 4 - 6 cm  Neck ROM: decreased range of motion   Mouth opening: Normal     Cardiovascular  Regular rate and rhythm,  S1 and S2 normal,  no murmur, click, rub, or gallop  Rhythm: regular  Rate: normal         Dental  No notable dental hx       Pulmonary  Breath sounds clear to auscultation               Abdominal  GI exam deferred       Other Findings            Anesthetic Plan    ASA: 1  Anesthesia type: general          Induction: Intravenous  Anesthetic plan and risks discussed with: Patient and Spouse

## 2018-12-27 NOTE — INTERVAL H&P NOTE
H&P Update:  Josey Jules was seen and examined. History and physical has been reviewed. The patient has been examined. There have been no significant clinical changes since the completion of the originally dated History and Physical.  Patient identified by surgeon; surgical site was confirmed by patient and surgeon.     Signed By: Abraham Villalobos MD     December 27, 2018 1:15 PM

## 2019-03-06 NOTE — H&P
Assessment/Plan  # Detail Type Description    1. Assessment Cancer of central portion of female breast (C50.119). Impression only DCIS remained and LN's clear. Patient Plan refer to rad onc and f/u 3 months          This 59year old female presents for Post Op. History of Present Illness:  1.   Post Op   f/u R brst lumpectomy/LN excision, little TIMMY drainage, 2 mm of DCIS, clear margins and neg LN's                    Allergies:  Ingredient Reaction (Severity) Medication Name Comment   NO KNOWN ALLERGIES            Physical Exam:  Exam Findings Details   Breast Comments incisions healing well, drain removed           Active Patient Care Team Members    Name Contact Agency Type Support Role Relationship Active Date Inactive Date Specialty   Thomas Echols   Patient provider PCP   Family Practice       Provider:   Chano Acevedo 01/04/2019 10:57 AM   Document generated by:  Guillermo Coates 01/04/2019 10:57 AM                           Electronically signed by Jerri Syed on 01/04/2019 12:07 PM  on behalf of Guillermo Coates MD

## 2019-03-07 ENCOUNTER — HOSPITAL ENCOUNTER (OUTPATIENT)
Age: 59
Setting detail: OUTPATIENT SURGERY
Discharge: HOME | End: 2019-03-07
Attending: SURGERY | Admitting: SURGERY
Payer: COMMERCIAL

## 2019-03-07 ENCOUNTER — ANESTHESIA (OUTPATIENT)
Dept: SURGERY | Age: 59
End: 2019-03-07
Payer: COMMERCIAL

## 2019-03-07 ENCOUNTER — ANESTHESIA EVENT (OUTPATIENT)
Dept: SURGERY | Age: 59
End: 2019-03-07
Payer: COMMERCIAL

## 2019-03-07 VITALS
DIASTOLIC BLOOD PRESSURE: 65 MMHG | RESPIRATION RATE: 18 BRPM | OXYGEN SATURATION: 100 % | HEIGHT: 63 IN | BODY MASS INDEX: 30.16 KG/M2 | TEMPERATURE: 97.5 F | WEIGHT: 170.25 LBS | HEART RATE: 66 BPM | SYSTOLIC BLOOD PRESSURE: 110 MMHG

## 2019-03-07 PROCEDURE — 76010000154 HC OR TIME FIRST 0.5 HR: Performed by: SURGERY

## 2019-03-07 PROCEDURE — 77030020782 HC GWN BAIR PAWS FLX 3M -B: Performed by: SURGERY

## 2019-03-07 PROCEDURE — 74011000250 HC RX REV CODE- 250: Performed by: SURGERY

## 2019-03-07 PROCEDURE — 74011250636 HC RX REV CODE- 250/636: Performed by: SURGERY

## 2019-03-07 PROCEDURE — 77030032490 HC SLV COMPR SCD KNE COVD -B: Performed by: SURGERY

## 2019-03-07 PROCEDURE — 74011250636 HC RX REV CODE- 250/636

## 2019-03-07 PROCEDURE — 76060000031 HC ANESTHESIA FIRST 0.5 HR: Performed by: SURGERY

## 2019-03-07 PROCEDURE — 77030002933 HC SUT MCRYL J&J -A: Performed by: SURGERY

## 2019-03-07 PROCEDURE — 77030011267 HC ELECTRD BLD COVD -A: Performed by: SURGERY

## 2019-03-07 PROCEDURE — 76210000020 HC REC RM PH II FIRST 0.5 HR: Performed by: SURGERY

## 2019-03-07 RX ORDER — FENTANYL CITRATE 50 UG/ML
25 INJECTION, SOLUTION INTRAMUSCULAR; INTRAVENOUS
Status: CANCELLED | OUTPATIENT
Start: 2019-03-07 | End: 2019-03-07

## 2019-03-07 RX ORDER — PROPOFOL 10 MG/ML
INJECTION, EMULSION INTRAVENOUS AS NEEDED
Status: DISCONTINUED | OUTPATIENT
Start: 2019-03-07 | End: 2019-03-07 | Stop reason: HOSPADM

## 2019-03-07 RX ORDER — ONDANSETRON 2 MG/ML
4 INJECTION INTRAMUSCULAR; INTRAVENOUS ONCE
Status: CANCELLED | OUTPATIENT
Start: 2019-03-07 | End: 2019-03-07

## 2019-03-07 RX ORDER — SODIUM CHLORIDE, SODIUM LACTATE, POTASSIUM CHLORIDE, CALCIUM CHLORIDE 600; 310; 30; 20 MG/100ML; MG/100ML; MG/100ML; MG/100ML
50 INJECTION, SOLUTION INTRAVENOUS CONTINUOUS
Status: CANCELLED | OUTPATIENT
Start: 2019-03-07

## 2019-03-07 RX ORDER — HYDROMORPHONE HYDROCHLORIDE 2 MG/ML
0.5 INJECTION, SOLUTION INTRAMUSCULAR; INTRAVENOUS; SUBCUTANEOUS
Status: CANCELLED | OUTPATIENT
Start: 2019-03-07

## 2019-03-07 RX ORDER — MIDAZOLAM HYDROCHLORIDE 1 MG/ML
INJECTION, SOLUTION INTRAMUSCULAR; INTRAVENOUS AS NEEDED
Status: DISCONTINUED | OUTPATIENT
Start: 2019-03-07 | End: 2019-03-07 | Stop reason: HOSPADM

## 2019-03-07 RX ORDER — NALOXONE HYDROCHLORIDE 0.4 MG/ML
0.1 INJECTION, SOLUTION INTRAMUSCULAR; INTRAVENOUS; SUBCUTANEOUS
Status: CANCELLED | OUTPATIENT
Start: 2019-03-07

## 2019-03-07 RX ORDER — BUPIVACAINE HYDROCHLORIDE 5 MG/ML
INJECTION, SOLUTION EPIDURAL; INTRACAUDAL AS NEEDED
Status: DISCONTINUED | OUTPATIENT
Start: 2019-03-07 | End: 2019-03-07 | Stop reason: HOSPADM

## 2019-03-07 RX ORDER — LIDOCAINE HYDROCHLORIDE 20 MG/ML
INJECTION, SOLUTION EPIDURAL; INFILTRATION; INTRACAUDAL; PERINEURAL AS NEEDED
Status: DISCONTINUED | OUTPATIENT
Start: 2019-03-07 | End: 2019-03-07 | Stop reason: HOSPADM

## 2019-03-07 RX ORDER — SODIUM CHLORIDE, SODIUM LACTATE, POTASSIUM CHLORIDE, CALCIUM CHLORIDE 600; 310; 30; 20 MG/100ML; MG/100ML; MG/100ML; MG/100ML
125 INJECTION, SOLUTION INTRAVENOUS CONTINUOUS
Status: DISCONTINUED | OUTPATIENT
Start: 2019-03-07 | End: 2019-03-07 | Stop reason: HOSPADM

## 2019-03-07 RX ADMIN — PROPOFOL 30 MG: 10 INJECTION, EMULSION INTRAVENOUS at 08:48

## 2019-03-07 RX ADMIN — MIDAZOLAM HYDROCHLORIDE 2 MG: 1 INJECTION, SOLUTION INTRAMUSCULAR; INTRAVENOUS at 08:38

## 2019-03-07 RX ADMIN — SODIUM CHLORIDE, SODIUM LACTATE, POTASSIUM CHLORIDE, AND CALCIUM CHLORIDE 125 ML/HR: 600; 310; 30; 20 INJECTION, SOLUTION INTRAVENOUS at 07:35

## 2019-03-07 RX ADMIN — LIDOCAINE HYDROCHLORIDE 60 MG: 20 INJECTION, SOLUTION EPIDURAL; INFILTRATION; INTRACAUDAL; PERINEURAL at 08:44

## 2019-03-07 RX ADMIN — PROPOFOL 30 MG: 10 INJECTION, EMULSION INTRAVENOUS at 08:44

## 2019-03-07 NOTE — ANESTHESIA POSTPROCEDURE EVALUATION
Post-Anesthesia Evaluation and Assessment    Cardiovascular Function/Vital Signs  Visit Vitals  /77   Pulse 66   Temp 36.4 °C (97.5 °F)   Resp 16   Ht 5' 3\" (1.6 m)   Wt 77.2 kg (170 lb 4 oz)   SpO2 100%   BMI 30.16 kg/m²       Patient is status post Procedure(s):  PORT A CATHETER REMOVAL **SPEC POP**. Nausea/Vomiting: Controlled. Postoperative hydration reviewed and adequate. Pain:  Pain Scale 1: Numeric (0 - 10) (03/07/19 0911)  Pain Intensity 1: 0 (03/07/19 0911)   Managed. Neurological Status:   Neuro (WDL): Within Defined Limits (03/07/19 0911)   At baseline. Mental Status and Level of Consciousness: Baseline and appropriate for discharge. Pulmonary Status:   O2 Device: Room air (03/07/19 0915)   Adequate oxygenation and airway patent. Complications related to anesthesia: None    Post-anesthesia assessment completed. No concerns. Patient has met all discharge requirements.     Signed By: Sherri Potter MD    March 7, 2019

## 2019-03-07 NOTE — ANESTHESIA PREPROCEDURE EVALUATION
Anesthetic History   No history of anesthetic complications            Review of Systems / Medical History  Patient summary reviewed, nursing notes reviewed and pertinent labs reviewed    Pulmonary  Within defined limits                 Neuro/Psych   Within defined limits           Cardiovascular  Within defined limits                Exercise tolerance: >4 METS     GI/Hepatic/Renal  Within defined limits              Endo/Other        Cancer (breast)     Other Findings              Physical Exam    Airway  Mallampati: II  TM Distance: 4 - 6 cm  Neck ROM: normal range of motion   Mouth opening: Normal     Cardiovascular               Dental  No notable dental hx       Pulmonary                 Abdominal         Other Findings            Anesthetic Plan    ASA: 2  Anesthesia type: MAC            Anesthetic plan and risks discussed with: Patient and Spouse

## 2019-03-07 NOTE — INTERVAL H&P NOTE
H&P Update:  Joellen Farrar was seen and examined. History and physical has been reviewed. The patient has been examined. There have been no significant clinical changes since the completion of the originally dated History and Physical.  Patient identified by surgeon; surgical site was confirmed by patient and surgeon.     Signed By: Deena Cleary MD     March 7, 2019 8:13 AM

## 2019-03-07 NOTE — DISCHARGE INSTRUCTIONS
Ice and elevation  Regular diet  Drink plenty of fluids  Remove surgical dressing in 2 days then you may shower  Ambulate in house    Follow up with Dr. Abby Bearden in 1 week          DISCHARGE SUMMARY from Nurse    PATIENT INSTRUCTIONS:    After general anesthesia or intravenous sedation, for 24 hours or while taking prescription Narcotics:  · Limit your activities  · Do not drive and operate hazardous machinery  · Do not make important personal or business decisions  · Do  not drink alcoholic beverages  · If you have not urinated within 8 hours after discharge, please contact your surgeon on call. Report the following to your surgeon:  · Excessive pain, swelling, redness or odor of or around the surgical area  · Temperature over 100.5  · Nausea and vomiting lasting longer than 4 hours or if unable to take medications  · Any signs of decreased circulation or nerve impairment to extremity: change in color, persistent  numbness, tingling, coldness or increase pain  · Any questions    What to do at Home:  Recommended activity: Activity as tolerated and Ambulate in house,     If you experience any of the following symptoms fever, chills, uncontrollable pain , active bleeding or drainage, please follow up with Dr. Abby Bearden. *  Please give a list of your current medications to your Primary Care Provider. *  Please update this list whenever your medications are discontinued, doses are      changed, or new medications (including over-the-counter products) are added. *  Please carry medication information at all times in case of emergency situations. These are general instructions for a healthy lifestyle:    No smoking/ No tobacco products/ Avoid exposure to second hand smoke  Surgeon General's Warning:  Quitting smoking now greatly reduces serious risk to your health.     Obesity, smoking, and sedentary lifestyle greatly increases your risk for illness    A healthy diet, regular physical exercise & weight monitoring are important for maintaining a healthy lifestyle    You may be retaining fluid if you have a history of heart failure or if you experience any of the following symptoms:  Weight gain of 3 pounds or more overnight or 5 pounds in a week, increased swelling in our hands or feet or shortness of breath while lying flat in bed. Please call your doctor as soon as you notice any of these symptoms; do not wait until your next office visit. Recognize signs and symptoms of STROKE:    F-face looks uneven    A-arms unable to move or move unevenly    S-speech slurred or non-existent    T-time-call 911 as soon as signs and symptoms begin-DO NOT go       Back to bed or wait to see if you get better-TIME IS BRAIN. Warning Signs of HEART ATTACK     Call 911 if you have these symptoms:   Chest discomfort. Most heart attacks involve discomfort in the center of the chest that lasts more than a few minutes, or that goes away and comes back. It can feel like uncomfortable pressure, squeezing, fullness, or pain.  Discomfort in other areas of the upper body. Symptoms can include pain or discomfort in one or both arms, the back, neck, jaw, or stomach.  Shortness of breath with or without chest discomfort.  Other signs may include breaking out in a cold sweat, nausea, or lightheadedness. Don't wait more than five minutes to call 911 - MINUTES MATTER! Fast action can save your life. Calling 911 is almost always the fastest way to get lifesaving treatment. Emergency Medical Services staff can begin treatment when they arrive -- up to an hour sooner than if someone gets to the hospital by car. Patient armband removed and shredded  The discharge information has been reviewed with the patient and caregiver. The patient and caregiver verbalized understanding.   Discharge medications reviewed with the patient and caregiver and appropriate educational materials and side effects teaching were provided.   ___________________________________________________________________________________________________________________________________

## 2019-03-07 NOTE — OP NOTES
[facility]  OPER[facility]    Name:  Keith Rahman  MR#:   [id number]  :  1960  ACCOUNT #:  [de-identified]  DATE OF SERVICE:  2019    PREOPERATIVE DIAGNOSIS:  Breast cancer. POSTOPERATIVE DIAGNOSIS:  Breast cancer. PROCEDURE PERFORMED:  Removal of left subclavian Port-A-Cath. SURGEON:  Sandoval Gloria MD    ASSISTANT:  Fay Servin    ANESTHESIA:  MAC.    COMPLICATIONS:  None. SPECIMENS REMOVED:  None. IMPLANTS:  None. ESTIMATED BLOOD LOSS:  3 mL. INDICATIONS FOR PROCEDURE:  This is a 59-year-old female who had left breast cancer and she was treated with systemic chemotherapy. She is now brought to the operating room for removal of her Port-A-Cath. DESCRIPTION OF PROCEDURE:  The patient was brought to the operating room, placed on the table in the supine position. After placing monitors and adequate IV sedation, the left upper chest was prepped and draped in the usual sterile fashion. SCD hoses were placed preoperatively. Marcaine was injected locally in the skin and subcutaneous tissue at the port site. A transverse incision was made in the previous scar through the skin down to subcutaneous tissue. Then using electrocautery dissection, the catheter was removed from the tract. The tract was oversewn with a figure-of-eight of 3-0 Monocryl suture. The port was removed from the pocket. The previously placed Prolene sutures were removed. The capsule of the pocket was scored with the electrocautery. Subcutaneous tissue was reapproximated with interrupted 3-0 Monocryl suture and 4-0 Monocryl was used to reapproximate the skin. Steri-Strips were applied and the wound was dressed sterilely. The sponge, instrument and needle counts were correct at the end of the procedure.       MD MARK [email or web address]  D:  2019 8:56  T:  2019 16:09  JOB #:  [id number]

## 2019-03-07 NOTE — PERIOP NOTES
Reviewed PTA medication list with patient/caregiver and patient/caregiver denies any additional medications. Patient admits to having a responsible adult care for them for at least 24 hours after surgery.     Permission granted by patient for a dual skin assessment. Dual skin assessment completed by Aaron Hernandez RN and Gabriele Mackey RN.

## 2024-07-18 ENCOUNTER — HOSPITAL ENCOUNTER (OUTPATIENT)
Facility: HOSPITAL | Age: 64
Discharge: HOME | End: 2024-07-20
Payer: MEDICARE

## 2024-07-18 ENCOUNTER — TRANSCRIBE ORDERS (OUTPATIENT)
Facility: HOSPITAL | Age: 64
End: 2024-07-18

## 2024-07-18 ENCOUNTER — HOSPITAL ENCOUNTER (OUTPATIENT)
Facility: HOSPITAL | Age: 64
End: 2024-07-18
Payer: MEDICARE

## 2024-07-18 DIAGNOSIS — Z01.812 PRE-OPERATIVE LABORATORY EXAMINATION: ICD-10-CM

## 2024-07-18 DIAGNOSIS — Z01.812 PRE-OPERATIVE LABORATORY EXAMINATION: Primary | ICD-10-CM

## 2024-07-18 LAB
BASOPHILS # BLD: 0 K/UL (ref 0–0.1)
BASOPHILS NFR BLD: 0 % (ref 0–2)
DIFFERENTIAL METHOD BLD: ABNORMAL
EOSINOPHIL # BLD: 0.1 K/UL (ref 0–0.4)
EOSINOPHIL NFR BLD: 1 % (ref 0–5)
ERYTHROCYTE [DISTWIDTH] IN BLOOD BY AUTOMATED COUNT: 13.6 % (ref 11.6–14.5)
HCT VFR BLD AUTO: 39.1 % (ref 35–45)
HGB BLD-MCNC: 12.7 G/DL (ref 12–16)
IMM GRANULOCYTES # BLD AUTO: 0 K/UL (ref 0–0.04)
IMM GRANULOCYTES NFR BLD AUTO: 0 % (ref 0–0.5)
LYMPHOCYTES # BLD: 1.5 K/UL (ref 0.9–3.6)
LYMPHOCYTES NFR BLD: 29 % (ref 21–52)
MCH RBC QN AUTO: 31.7 PG (ref 24–34)
MCHC RBC AUTO-ENTMCNC: 32.5 G/DL (ref 31–37)
MCV RBC AUTO: 97.5 FL (ref 78–100)
MONOCYTES # BLD: 0.4 K/UL (ref 0.05–1.2)
MONOCYTES NFR BLD: 9 % (ref 3–10)
NEUTS SEG # BLD: 3.2 K/UL (ref 1.8–8)
NEUTS SEG NFR BLD: 62 % (ref 40–73)
NRBC # BLD: 0 K/UL (ref 0–0.01)
NRBC BLD-RTO: 0 PER 100 WBC
PLATELET # BLD AUTO: 228 K/UL (ref 135–420)
PMV BLD AUTO: 10.1 FL (ref 9.2–11.8)
RBC # BLD AUTO: 4.01 M/UL (ref 4.2–5.3)
WBC # BLD AUTO: 5.1 K/UL (ref 4.6–13.2)

## 2024-07-18 PROCEDURE — 85025 COMPLETE CBC W/AUTO DIFF WBC: CPT

## 2024-07-18 PROCEDURE — 36415 COLL VENOUS BLD VENIPUNCTURE: CPT

## 2024-07-18 PROCEDURE — 93005 ELECTROCARDIOGRAM TRACING: CPT

## 2024-07-19 LAB
EKG ATRIAL RATE: 60 BPM
EKG DIAGNOSIS: NORMAL
EKG P AXIS: 60 DEGREES
EKG P-R INTERVAL: 146 MS
EKG Q-T INTERVAL: 388 MS
EKG QRS DURATION: 74 MS
EKG QTC CALCULATION (BAZETT): 388 MS
EKG R AXIS: 51 DEGREES
EKG T AXIS: 66 DEGREES
EKG VENTRICULAR RATE: 60 BPM

## 2024-07-25 ENCOUNTER — HOSPITAL ENCOUNTER (OUTPATIENT)
Facility: HOSPITAL | Age: 64
Setting detail: SPECIMEN
Discharge: HOME | End: 2024-07-25
Attending: OBSTETRICS & GYNECOLOGY
Payer: MEDICARE

## 2024-07-25 PROCEDURE — 88305 TISSUE EXAM BY PATHOLOGIST: CPT

## (undated) DEVICE — TELFA NON-ADHERENT ABSORBENT DRESSING: Brand: TELFA

## (undated) DEVICE — REM POLYHESIVE ADULT PATIENT RETURN ELECTRODE: Brand: VALLEYLAB

## (undated) DEVICE — ATTACHMENT SMK EVAC FOR ES PNCL ACCUVAC

## (undated) DEVICE — NEEDLE HYPO 25GA L1.5IN BLU POLYPR HUB S STL REG BVL STR

## (undated) DEVICE — KENDALL SCD EXPRESS SLEEVES, KNEE LENGTH, MEDIUM: Brand: KENDALL SCD

## (undated) DEVICE — MINOR: Brand: MEDLINE INDUSTRIES, INC.

## (undated) DEVICE — GOWN,SIRUS,NONRNF,SETINSLV,XL,20/CS: Brand: MEDLINE

## (undated) DEVICE — NDL PRT INJ NSAF BLNT 18GX1.5 --

## (undated) DEVICE — SUT PROL 2-0 30IN SH BLU --

## (undated) DEVICE — GAUZE SPONGES,12 PLY: Brand: CURITY

## (undated) DEVICE — SUTURE MCRYL SZ 3-0 L27IN ABSRB UD L26MM SH 1/2 CIR Y416H

## (undated) DEVICE — 1010 S-DRAPE TOWEL DRAPE 10/BX: Brand: STERI-DRAPE™

## (undated) DEVICE — SPONGE GZ W4XL4IN COT 12 PLY TYP VII WVN C FLD DSGN

## (undated) DEVICE — HEAD REST BAGEL: Brand: DEVON

## (undated) DEVICE — INSULATED BLADE ELECTRODE: Brand: EDGE

## (undated) DEVICE — DRAPE TWL SURG 16X26IN BLU ORB04] ALLCARE INC]

## (undated) DEVICE — STERILE POLYISOPRENE POWDER-FREE SURGICAL GLOVES WITH EMOLLIENT COATING: Brand: PROTEXIS

## (undated) DEVICE — (D)STRIP SKN CLSR 0.5X4IN WHT --

## (undated) DEVICE — DRAPE XR C ARM 41X74IN LF --

## (undated) DEVICE — MASTISOL ADHESIVE LIQ 2/3ML

## (undated) DEVICE — SUT SLK 2-0 18IN FS BLK --

## (undated) DEVICE — 4-PORT MANIFOLD: Brand: NEPTUNE 2

## (undated) DEVICE — DECANTING SET

## (undated) DEVICE — DRAPE,THYROID,SOFT,STERILE: Brand: MEDLINE

## (undated) DEVICE — SUT MONOCRYL PLUS UD 4-0 --

## (undated) DEVICE — (D)PREP SKN CHLRAPRP APPL 26ML -- CONVERT TO ITEM 371833

## (undated) DEVICE — SOLUTION IV 250ML 0.9% SOD CHL CLR INJ FLX BG CONT PRT CLSR

## (undated) DEVICE — INTENDED FOR TISSUE SEPARATION, AND OTHER PROCEDURES THAT REQUIRE A SHARP SURGICAL BLADE TO PUNCTURE OR CUT.: Brand: BARD-PARKER ® CARBON RIB-BACK BLADES

## (undated) DEVICE — Device

## (undated) DEVICE — APPLIER LIG CLP M L11IN TI STR RNG HNDL FOR 20 CLP DISP

## (undated) DEVICE — STRIP,CLOSURE,WOUND,MEDI-STRIP,1/2X4: Brand: MEDLINE

## (undated) DEVICE — SHEET,DRAPE,40X58,STERILE: Brand: MEDLINE

## (undated) DEVICE — SUT MONOCRYL PLUS UD 3-0 --

## (undated) DEVICE — DRAIN SURG 15FR L3/16IN SIL RND 3/4 FLUT 3/16IN TRCR

## (undated) DEVICE — SOL IRRIGATION INJ NACL 0.9% 500ML BTL

## (undated) DEVICE — SYR 3ML LL TIP 1/10ML GRAD --

## (undated) DEVICE — DRSG PATCH ANTIMIC 1INX4.0MM -- CONVERT TO ITEM 356053

## (undated) DEVICE — SUT ETHLN 3-0 18IN PS2 BLK --

## (undated) DEVICE — COVER US PRB W4XL15IN GAM CRDLSS FLD